# Patient Record
(demographics unavailable — no encounter records)

---

## 2024-10-08 NOTE — PROCEDURE
[FreeTextEntry3] : Injection Procedure Note:  The risks, benefits, and alternatives to viscosupplementation injection were reviewed with the patient. Risks outlined include but are not limited to infection, sepsis, bleeding, scarring, temporary increase in pain, syncopal episode, failure to resolve symptoms, symptoms recurrence, allergic reaction, flare reaction, pseudoseptic reaction.  Patient understood the risks and asked to proceed with this treatment course.  Patient Identification Name/: Verbal with patient and/or family  Procedure Verification: Procedure confirmed with patient or family/designee Consent for procedure: Verbal Consent Given Relevant documentation completed, reviewed, and signed Clinical indications for procedure confirmed  Time-out with all members of procedure team immediately prior to procedure: Correct patient identified. Agreement on procedure. Correct side and site.   KNEE INJECTION (Visco) - RIGHT After verbal consent and identification of the correct patient and correct site, the right knee were prepped using alcohol swabs and betadine. This was allowed time to air dry.  An injection of EUFLEXXA 2ml  #2 was injected into the knee using a sterile 22G needle after ethyl chloride spray for skin anesthesia.  The patient tolerated the procedure well. After-care instructions were provided and included instructions to ice the area and to call if redness, pain, or fever develop.

## 2024-10-08 NOTE — IMAGING
[de-identified] : RIGHT KNEE Inspection:  minimal effusion Palpation: medial facet of the patella tenderness  Knee Range of Motion:  0-130 Strength: 5/5 Quadriceps strength, 5/5 Hamstring strength, 4/5 Hip Abductor strength Neurological: light touch is intact throughout Ligament Stability and Special Tests:  McMurrays: neg Lachman: neg Pivot Shift: neg Posterior Drawer: neg Valgus: neg Varus: neg Patella Apprehension: neg Patella Maltracking: neg

## 2024-10-08 NOTE — HISTORY OF PRESENT ILLNESS
[de-identified] : 46 year old male  (RHD,  [facility]  ) right knee chronic pain worsen since 9/2023     The pain is located  anterior, medial deep  The pain is associated with swelling, clicking  Worse with activity and better at rest. Has tried ice, heat, nsaids   12/14/23 - had  CSI (11/20/23) without much relief, doing [facility] 1/11/24 - had mri, cont achiness in knee 2/15/24 - sent for more PT, icing, nsaids, wants to discuss poss visco 2/22/24 - here for Right knee Orthovisc #2 3/7/24 - here for Right knee Orthovisc #3 3/7/24 - here for Right knee Orthovisc #4  9/9/24- had good relief from visco but now pain reutnrs since summer 2024 worse with activity 9/26/24 - had CSI (9/9), continue to have knee pain 10/10/24 - here for R knee Euflexxa #2

## 2024-10-08 NOTE — ASSESSMENT
[FreeTextEntry1] : mri right knee ZP 12/18/23 - early chondral loss pf and medial, tiny LMT, synov    - We discussed their diagnosis and treatment options at length including the risks and benefits of both surgical treatment with a knee replacement and non-surgical options. Surgical risks include but are not limited to pain, infection, bleeding, vascular injury, numbness, tingling, nerve damage. - Due to risks of surgery, they will continue conservative treatment with PT, icing, and anti-inflammatory medications - The patient was provided with a PT prescription to work on ROM, hip ER/abductors strengthening, quad/hamstring stretches and strengthening, and other exercises - The patient was advised to let pain guide the gradual advancement of activities. - we spoke about possibility of viscosupplementation injections and they would like to proceed - R knee Euflexxa #2 given, frederic well

## 2024-10-10 NOTE — IMAGING
[de-identified] : RIGHT KNEE Inspection:  minimal effusion Palpation: medial facet of the patella tenderness  Knee Range of Motion:  0-130 Strength: 5/5 Quadriceps strength, 5/5 Hamstring strength, 4/5 Hip Abductor strength Neurological: light touch is intact throughout Ligament Stability and Special Tests:  McMurrays: neg Lachman: neg Pivot Shift: neg Posterior Drawer: neg Valgus: neg Varus: neg Patella Apprehension: neg Patella Maltracking: neg

## 2024-10-10 NOTE — HISTORY OF PRESENT ILLNESS
[de-identified] : 46 year old male  (RHD,  [facility]  ) right knee chronic pain worsen since 9/2023     The pain is located  anterior, medial deep  The pain is associated with swelling, clicking  Worse with activity and better at rest. Has tried ice, heat, nsaids   12/14/23 - had  CSI (11/20/23) without much relief, doing [facility] 1/11/24 - had mri, cont achiness in knee 2/15/24 - sent for more PT, icing, nsaids, wants to discuss poss visco 2/22/24 - here for Right knee Orthovisc #2 3/7/24 - here for Right knee Orthovisc #3 3/7/24 - here for Right knee Orthovisc #4  9/9/24- had good relief from visco but now pain reutnrs since summer 2024 worse with activity 9/26/24 - had CSI (9/9), continue to have knee pain 10/10/24 - here for R knee Euflexxa #2

## 2024-10-17 NOTE — IMAGING
[de-identified] : RIGHT KNEE Inspection:  minimal effusion Palpation: medial facet of the patella tenderness  Knee Range of Motion:  0-130 Strength: 5/5 Quadriceps strength, 5/5 Hamstring strength, 4/5 Hip Abductor strength Neurological: light touch is intact throughout Ligament Stability and Special Tests:  McMurrays: neg Lachman: neg Pivot Shift: neg Posterior Drawer: neg Valgus: neg Varus: neg Patella Apprehension: neg Patella Maltracking: neg

## 2024-10-17 NOTE — ASSESSMENT
[FreeTextEntry1] : mri right knee ZP 12/18/23 - early chondral loss pf and medial, tiny LMT, synov    - We discussed their diagnosis and treatment options at length including the risks and benefits of both surgical treatment with a knee replacement and non-surgical options. Surgical risks include but are not limited to pain, infection, bleeding, vascular injury, numbness, tingling, nerve damage. - Due to risks of surgery, they will continue conservative treatment with PT, icing, and anti-inflammatory medications - The patient was provided with a PT prescription to work on ROM, hip ER/abductors strengthening, quad/hamstring stretches and strengthening, and other exercises - The patient was advised to let pain guide the gradual advancement of activities. - we spoke about possibility of viscosupplementation injections and they would like to proceed - R knee Euflexxa #3 given, frederic well

## 2024-10-17 NOTE — HISTORY OF PRESENT ILLNESS
[de-identified] : 46 year old male  (RHD,  [facility]  ) right knee chronic pain worsen since 9/2023     The pain is located  anterior, medial deep  The pain is associated with swelling, clicking  Worse with activity and better at rest. Has tried ice, heat, nsaids   12/14/23 - had  CSI (11/20/23) without much relief, doing [facility] 1/11/24 - had mri, cont achiness in knee 2/15/24 - sent for more PT, icing, nsaids, wants to discuss poss visco 2/22/24 - here for Right knee Orthovisc #2 3/7/24 - here for Right knee Orthovisc #3 3/7/24 - here for Right knee Orthovisc #4  9/9/24- had good relief from visco but now pain reutnrs since summer 2024 worse with activity 9/26/24 - had CSI (9/9), continue to have knee pain 10/10/24 - here for R knee Euflexxa #2  10/17/24 - here for R knee Euflexxa #3

## 2024-10-17 NOTE — HISTORY OF PRESENT ILLNESS
[de-identified] : 46 year old male  (RHD,  [facility]  ) right knee chronic pain worsen since 9/2023     The pain is located  anterior, medial deep  The pain is associated with swelling, clicking  Worse with activity and better at rest. Has tried ice, heat, nsaids   12/14/23 - had  CSI (11/20/23) without much relief, doing [facility] 1/11/24 - had mri, cont achiness in knee 2/15/24 - sent for more PT, icing, nsaids, wants to discuss poss visco 2/22/24 - here for Right knee Orthovisc #2 3/7/24 - here for Right knee Orthovisc #3 3/7/24 - here for Right knee Orthovisc #4  9/9/24- had good relief from visco but now pain reutnrs since summer 2024 worse with activity 9/26/24 - had CSI (9/9), continue to have knee pain 10/10/24 - here for R knee Euflexxa #2  10/17/24 - here for R knee Euflexxa #3

## 2024-10-17 NOTE — IMAGING
[de-identified] : RIGHT KNEE Inspection:  minimal effusion Palpation: medial facet of the patella tenderness  Knee Range of Motion:  0-130 Strength: 5/5 Quadriceps strength, 5/5 Hamstring strength, 4/5 Hip Abductor strength Neurological: light touch is intact throughout Ligament Stability and Special Tests:  McMurrays: neg Lachman: neg Pivot Shift: neg Posterior Drawer: neg Valgus: neg Varus: neg Patella Apprehension: neg Patella Maltracking: neg

## 2024-10-17 NOTE — PROCEDURE
[FreeTextEntry3] : Injection Procedure Note:  The risks, benefits, and alternatives to viscosupplementation injection were reviewed with the patient. Risks outlined include but are not limited to infection, sepsis, bleeding, scarring, temporary increase in pain, syncopal episode, failure to resolve symptoms, symptoms recurrence, allergic reaction, flare reaction, pseudoseptic reaction.  Patient understood the risks and asked to proceed with this treatment course.  Patient Identification Name/: Verbal with patient and/or family  Procedure Verification: Procedure confirmed with patient or family/designee Consent for procedure: Verbal Consent Given Relevant documentation completed, reviewed, and signed Clinical indications for procedure confirmed  Time-out with all members of procedure team immediately prior to procedure: Correct patient identified. Agreement on procedure. Correct side and site.   KNEE INJECTION (Visco) - RIGHT After verbal consent and identification of the correct patient and correct site, the right knee were prepped using alcohol swabs and betadine. This was allowed time to air dry.  An injection of EUFLEXXA 2ml  #3 was injected into the knee using a sterile 22G needle after ethyl chloride spray for skin anesthesia.  The patient tolerated the procedure well. After-care instructions were provided and included instructions to ice the area and to call if redness, pain, or fever develop.

## 2025-01-23 NOTE — IMAGING
[de-identified] : RIGHT KNEE Inspection:  minimal effusion Palpation: medial facet of the patella tenderness  Knee Range of Motion:  0-130 Strength: 5/5 Quadriceps strength, 5/5 Hamstring strength, 4/5 Hip Abductor strength Neurological: light touch is intact throughout Ligament Stability and Special Tests:  McMurrays: neg Lachman: neg Pivot Shift: neg Posterior Drawer: neg Valgus: neg Varus: neg Patella Apprehension: neg Patella Maltracking: neg

## 2025-01-23 NOTE — ASSESSMENT
[FreeTextEntry1] : mri right knee ZP 12/18/23 - early chondral loss pf and medial, tiny LMT, synov    - We discussed their diagnosis and treatment options at length including the risks and benefits of both surgical treatment with a knee replacement and non-surgical options. Surgical risks include but are not limited to pain, infection, bleeding, vascular injury, numbness, tingling, nerve damage. - Due to risks of surgery, they will continue conservative treatment with PT, icing, and anti-inflammatory medications - The patient was provided with a PT prescription to work on ROM, hip ER/abductors strengthening, quad/hamstring stretches and strengthening, and other exercises - diclofenac rx - Patient was given a prescription for an anti-inflammatory medication.  They will take it for the next week and then on an as needed basis, as long as there are no medical contra-indications.  Patient is counseled on possible GI, renal, and cardiovascular side effects. - We also discussed the possible of a corticosteroid injection in order to help decrease inflammation and pain so that they can perform better therapy and they wished to proceed with this treatment course. - The patient was advised to let pain guide the gradual advancement of activities.

## 2025-01-23 NOTE — HISTORY OF PRESENT ILLNESS
[de-identified] : 46 year old male  (RHD,  [facility]  ) right knee chronic pain worsen since 9/2023     The pain is located  anterior, medial deep  The pain is associated with swelling, clicking  Worse with activity and better at rest. Has tried ice, heat, nsaids   12/14/23 - had  CSI (11/20/23) without much relief, doing [facility] 1/11/24 - had mri, cont achiness in knee 2/15/24 - sent for more PT, icing, nsaids, wants to discuss poss visco 2/22/24 - here for Right knee Orthovisc #2 3/7/24 - here for Right knee Orthovisc #3 3/7/24 - here for Right knee Orthovisc #4  9/9/24- had good relief from visco but now pain reutnrs since summer 2024 worse with activity 9/26/24 - had CSI (9/9), continue to have knee pain 10/10/24 - here for R knee Euflexxa #2  10/17/24 - here for R knee Euflexxa #3 1/23/25- R knee pain returns due to activity.  had temp rleief with visco, doing HEP

## 2025-04-29 NOTE — ASSESSMENT
[FreeTextEntry1] : mri right knee ZP 12/18/23 - early chondral loss pf and medial, tiny LMT, synov  Right X-Ray Examination of the KNEE (4 views): medial, lateral and patellofemoral degenerative changes.   - We discussed their diagnosis and treatment options at length including the risks and benefits of both surgical treatment with a knee replacement and non-surgical options. Surgical risks include but are not limited to pain, infection, bleeding, vascular injury, numbness, tingling, nerve damage. - Due to risks of surgery, they will continue conservative treatment with PT, icing, and anti-inflammatory medications - The patient was provided with a PT prescription to work on ROM, hip ER/abductors strengthening, quad/hamstring stretches and strengthening, and other exercises - diclofenac rx - Patient was given a prescription for an anti-inflammatory medication.  They will take it for the next week and then on an as needed basis, as long as there are no medical contra-indications.  Patient is counseled on possible GI, renal, and cardiovascular side effects. - We also discussed the possible of a corticosteroid injection in order to help decrease inflammation and pain so that they can perform better therapy and they wished to proceed with this treatment course. - The patient was advised to let pain guide the gradual advancement of activities.

## 2025-04-29 NOTE — IMAGING
[de-identified] : RIGHT KNEE Inspection:  minimal effusion Palpation: lateral and medial facet of the patella tenderness  Knee Range of Motion:  0-130 Strength: 5/5 Quadriceps strength, 5/5 Hamstring strength, 4/5 Hip Abductor strength Neurological: light touch is intact throughout Ligament Stability and Special Tests:  McMurrays: neg Lachman: neg Pivot Shift: neg Posterior Drawer: neg Valgus: neg Varus: neg Patella Apprehension: neg Patella Maltracking: neg

## 2025-04-29 NOTE — HISTORY OF PRESENT ILLNESS
[de-identified] : 46 year old male  (RHD,  [facility]  ) right knee chronic pain worsen since 9/2023     The pain is located  anterior, medial, lateral deep  The pain is associated with swelling, clicking  Worse with activity and better at rest. Has tried ice, heat, nsaids   12/14/23 - had  CSI (11/20/23) without much relief, doing [facility] 1/11/24 - had mri, cont achiness in knee 2/15/24 - sent for more PT, icing, nsaids, wants to discuss poss visco 2/22/24 - here for Right knee Orthovisc #2 3/7/24 - here for Right knee Orthovisc #3 3/7/24 - here for Right knee Orthovisc #4  9/9/24- had good relief from visco but now pain reutnrs since summer 2024 worse with activity 9/26/24 - had CSI (9/9), continue to have knee pain 10/10/24 - here for R knee Euflexxa #2  10/17/24 - here for R knee Euflexxa #3 1/23/25- R knee pain returns due to activity.  had temp rleief with visco, doing HEP 4/29/25 - CSI last visit (1/23/25) with relief until 3/28/25. Cont knee pain with activity.

## 2025-05-06 NOTE — HISTORY OF PRESENT ILLNESS
[de-identified] : 46 year old male  (RHD,  [facility]  ) right knee chronic pain worsen since 9/2023     The pain is located  anterior, medial, lateral deep  The pain is associated with swelling, clicking  Worse with activity and better at rest. Has tried ice, heat, nsaids   12/14/23 - had  CSI (11/20/23) without much relief, doing PT [facility] 1/11/24 - had mri, cont achiness in knee 2/15/24 - sent for more PT, icing, nsaids, wants to discuss poss visco 2/22/24 - here for Right knee Orthovisc #2 3/7/24 - here for Right knee Orthovisc #3 3/7/24 - here for Right knee Orthovisc #4  9/9/24- had good relief from visco but now pain reutnrs since summer 2024 worse with activity 9/26/24 - had CSI (9/9), continue to have knee pain 10/10/24 - here for R knee Euflexxa #2  10/17/24 - here for R knee Euflexxa #3 1/23/25- R knee pain returns due to activity.  had temp rleief with visco, doing HEP 4/29/25 - CSI last visit (1/23/25) with relief until 3/28/25. Cont knee pain with activity.  5/6/25 - had CSI (4/29), continue to have knee pain

## 2025-05-06 NOTE — ASSESSMENT
[FreeTextEntry1] : mri right knee ZP 12/18/23 - early chondral loss pf and medial, tiny LMT, synov  medial, lateral and patellofemoral degenerative changes.   - We discussed their diagnosis and treatment options at length including the risks and benefits of both surgical treatment with a knee replacement and non-surgical options. Surgical risks include but are not limited to pain, infection, bleeding, vascular injury, numbness, tingling, nerve damage. - Due to risks of surgery, they will continue conservative treatment with PT, icing, and anti-inflammatory medications - The patient was provided with a PT prescription to work on ROM, hip ER/abductors strengthening, quad/hamstring stretches and strengthening, and other exercises - The patient was advised to let pain guide the gradual advancement of activities. - we spoke about possibility of viscosupplementation injections and they want to proceed - R knee Euflexxa #1 given, frederic well    Medication Discussion: 1) We discussed a comprehensive treatment plan that included possible pharmaceutical management involving the use of prescription strength medications including but not limited to options such as oral Naprosyn 500mg BID, once daily Meloxicam 15 mg, or 500-650 mg Tylenol versus over the counter oral medications in addition to discussing possible topical prescription Pennsaid vs  Voltaren gel. 2) There is a moderate risk of morbidity with further treatment, especially from use of prescription strength medications and possible side effects of these medications which include but are not limited to upset stomach with oral medications, skin reactions to topical medications and GI/cardiac/renal issues with long term use. 3) I recommended that the patient follow-up with their medical physician if there are any significant potential issues with long term medication use such as interactions with current medications or with exacerbation of underlying medical comorbidities. 4) The benefits and risks associated with use of oral and / or topical prescription and over the counter anti-inflammatory medications were discussed with the patient. The patient voiced understanding of the risks including but not limited to bleeding, stroke, kidney dysfunction, heart disease, and were referred to the black box warning label for further information.

## 2025-05-06 NOTE — PROCEDURE
[FreeTextEntry3] :  Injection Procedure Note:  The risks, benefits, and alternatives to viscosupplementation injection were reviewed with the patient. Risks outlined include but are not limited to infection, sepsis, bleeding, scarring, temporary increase in pain, syncopal episode, failure to resolve symptoms, symptoms recurrence, allergic reaction, flare reaction, pseudoseptic reaction.  Patient understood the risks and asked to proceed with this treatment course.  Patient Identification Name/: Verbal with patient and/or family  Procedure Verification: Procedure confirmed with patient or family/designee Consent for procedure: Verbal Consent Given Relevant documentation completed, reviewed, and signed Clinical indications for procedure confirmed  Time-out with all members of procedure team immediately prior to procedure: Correct patient identified. Agreement on procedure. Correct side and site.   KNEE INJECTION (Visco) - RIGHT After verbal consent and identification of the correct patient and correct site, the right knee were prepped using alcohol swabs and betadine. This was allowed time to air dry.  An injection of EUFLEXXA 2ml  #1  was injected into the knee using a sterile 22G needle after ethyl chloride spray for skin anesthesia.  The patient tolerated the procedure well. After-care instructions were provided and included instructions to ice the area and to call if redness, pain, or fever develop.

## 2025-05-06 NOTE — IMAGING
[de-identified] : RIGHT KNEE Inspection:  minimal effusion Palpation: lateral and medial facet of the patella tenderness  Knee Range of Motion:  0-130 Strength: 5/5 Quadriceps strength, 5/5 Hamstring strength, 4/5 Hip Abductor strength Neurological: light touch is intact throughout Ligament Stability and Special Tests:  McMurrays: neg Lachman: neg Pivot Shift: neg Posterior Drawer: neg Valgus: neg Varus: neg Patella Apprehension: neg Patella Maltracking: neg

## 2025-05-06 NOTE — IMAGING
[de-identified] : RIGHT KNEE Inspection:  minimal effusion Palpation: lateral and medial facet of the patella tenderness  Knee Range of Motion:  0-130 Strength: 5/5 Quadriceps strength, 5/5 Hamstring strength, 4/5 Hip Abductor strength Neurological: light touch is intact throughout Ligament Stability and Special Tests:  McMurrays: neg Lachman: neg Pivot Shift: neg Posterior Drawer: neg Valgus: neg Varus: neg Patella Apprehension: neg Patella Maltracking: neg

## 2025-05-06 NOTE — HISTORY OF PRESENT ILLNESS
[de-identified] : 46 year old male  (RHD,  [facility]  ) right knee chronic pain worsen since 9/2023     The pain is located  anterior, medial, lateral deep  The pain is associated with swelling, clicking  Worse with activity and better at rest. Has tried ice, heat, nsaids   12/14/23 - had  CSI (11/20/23) without much relief, doing PT [facility] 1/11/24 - had mri, cont achiness in knee 2/15/24 - sent for more PT, icing, nsaids, wants to discuss poss visco 2/22/24 - here for Right knee Orthovisc #2 3/7/24 - here for Right knee Orthovisc #3 3/7/24 - here for Right knee Orthovisc #4  9/9/24- had good relief from visco but now pain reutnrs since summer 2024 worse with activity 9/26/24 - had CSI (9/9), continue to have knee pain 10/10/24 - here for R knee Euflexxa #2  10/17/24 - here for R knee Euflexxa #3 1/23/25- R knee pain returns due to activity.  had temp rleief with visco, doing HEP 4/29/25 - CSI last visit (1/23/25) with relief until 3/28/25. Cont knee pain with activity.  5/6/25 - had CSI (4/29), continue to have knee pain

## 2025-05-13 NOTE — IMAGING
[de-identified] : RIGHT KNEE Inspection:  minimal effusion Palpation: lateral and medial facet of the patella tenderness  Knee Range of Motion:  0-130 Strength: 5/5 Quadriceps strength, 5/5 Hamstring strength, 4/5 Hip Abductor strength Neurological: light touch is intact throughout Ligament Stability and Special Tests:  McMurrays: neg Lachman: neg Pivot Shift: neg Posterior Drawer: neg Valgus: neg Varus: neg Patella Apprehension: neg Patella Maltracking: neg      Split-Thickness Skin Graft Text: The defect edges were debeveled with a #15 scalpel blade.  Given the location of the defect, shape of the defect and the proximity to free margins a split thickness skin graft was deemed most appropriate.  Using a sterile surgical marker, the primary defect shape was transferred to the donor site. The split thickness graft was then harvested.  The skin graft was then placed in the primary defect and oriented appropriately.

## 2025-05-13 NOTE — IMAGING
[de-identified] : RIGHT KNEE Inspection:  minimal effusion Palpation: lateral and medial facet of the patella tenderness  Knee Range of Motion:  0-130 Strength: 5/5 Quadriceps strength, 5/5 Hamstring strength, 4/5 Hip Abductor strength Neurological: light touch is intact throughout Ligament Stability and Special Tests:  McMurrays: neg Lachman: neg Pivot Shift: neg Posterior Drawer: neg Valgus: neg Varus: neg Patella Apprehension: neg Patella Maltracking: neg

## 2025-05-13 NOTE — HISTORY OF PRESENT ILLNESS
[de-identified] : 46 year old male  (RHD,  [facility]  ) right knee chronic pain worsen since 9/2023     The pain is located  anterior, medial, lateral deep  The pain is associated with swelling, clicking  Worse with activity and better at rest. Has tried ice, heat, nsaids   12/14/23 - had  CSI (11/20/23) without much relief, doing PT [facility] 1/11/24 - had mri, cont achiness in knee 2/15/24 - sent for more PT, icing, nsaids, wants to discuss poss visco 2/22/24 - here for Right knee Orthovisc #2 3/7/24 - here for Right knee Orthovisc #3 3/7/24 - here for Right knee Orthovisc #4  9/9/24- had good relief from visco but now pain reutnrs since summer 2024 worse with activity 9/26/24 - had CSI (9/9), continue to have knee pain 10/10/24 - here for R knee Euflexxa #2  10/17/24 - here for R knee Euflexxa #3 1/23/25- R knee pain returns due to activity.  had temp rleief with visco, doing HEP 4/29/25 - CSI last visit (1/23/25) with relief until 3/28/25. Cont knee pain with activity.  5/6/25 - had CSI (4/29), continue to have knee pain 5/13/25 - here for R knee Euflexxa #2

## 2025-05-13 NOTE — HISTORY OF PRESENT ILLNESS
[de-identified] : 46 year old male  (RHD,  [facility]  ) right knee chronic pain worsen since 9/2023     The pain is located  anterior, medial, lateral deep  The pain is associated with swelling, clicking  Worse with activity and better at rest. Has tried ice, heat, nsaids   12/14/23 - had  CSI (11/20/23) without much relief, doing [facility] 1/11/24 - had mri, cont achiness in knee 2/15/24 - sent for more PT, icing, nsaids, wants to discuss poss visco 2/22/24 - here for Right knee Orthovisc #2 3/7/24 - here for Right knee Orthovisc #3 3/7/24 - here for Right knee Orthovisc #4  9/9/24- had good relief from visco but now pain reutnrs since summer 2024 worse with activity 9/26/24 - had CSI (9/9), continue to have knee pain 10/10/24 - here for R knee Euflexxa #2  10/17/24 - here for R knee Euflexxa #3 1/23/25- R knee pain returns due to activity.  had temp rleief with visco, doing HEP 4/29/25 - CSI last visit (1/23/25) with relief until 3/28/25. Cont knee pain with activity.  5/6/25 - had CSI (4/29), continue to have knee pain 5/13/25 - here for R knee Euflexxa #2

## 2025-05-13 NOTE — ASSESSMENT
[FreeTextEntry1] : mri right knee ZP 12/18/23 - early chondral loss pf and medial, tiny LMT, synov  medial, lateral and patellofemoral degenerative changes.   - We discussed their diagnosis and treatment options at length including the risks and benefits of both surgical treatment with a knee replacement and non-surgical options. Surgical risks include but are not limited to pain, infection, bleeding, vascular injury, numbness, tingling, nerve damage. - Due to risks of surgery, they will continue conservative treatment with PT, icing, and anti-inflammatory medications - The patient was provided with a PT prescription to work on ROM, hip ER/abductors strengthening, quad/hamstring stretches and strengthening, and other exercises - The patient was advised to let pain guide the gradual advancement of activities. - we spoke about possibility of viscosupplementation injections and they want to proceed - R knee Euflexxa #2 given, frederic well   
[FreeTextEntry1] : mri right knee ZP 12/18/23 - early chondral loss pf and medial, tiny LMT, synov  medial, lateral and patellofemoral degenerative changes.   - We discussed their diagnosis and treatment options at length including the risks and benefits of both surgical treatment with a knee replacement and non-surgical options. Surgical risks include but are not limited to pain, infection, bleeding, vascular injury, numbness, tingling, nerve damage. - Due to risks of surgery, they will continue conservative treatment with PT, icing, and anti-inflammatory medications - The patient was provided with a PT prescription to work on ROM, hip ER/abductors strengthening, quad/hamstring stretches and strengthening, and other exercises - The patient was advised to let pain guide the gradual advancement of activities. - we spoke about possibility of viscosupplementation injections and they want to proceed - R knee Euflexxa #2 given, frederic well   
Occiput Anterior

## 2025-05-20 NOTE — IMAGING
[de-identified] : RIGHT KNEE Inspection:  minimal effusion Palpation: lateral and medial facet of the patella tenderness  Knee Range of Motion:  0-130 Strength: 5/5 Quadriceps strength, 5/5 Hamstring strength, 4/5 Hip Abductor strength Neurological: light touch is intact throughout Ligament Stability and Special Tests:  McMurrays: neg Lachman: neg Pivot Shift: neg Posterior Drawer: neg Valgus: neg Varus: neg Patella Apprehension: neg Patella Maltracking: neg

## 2025-05-20 NOTE — HISTORY OF PRESENT ILLNESS
[de-identified] : 46 year old male  (RHD,  [facility]  ) right knee chronic pain worsen since 9/2023     The pain is located  anterior, medial, lateral deep  The pain is associated with swelling, clicking  Worse with activity and better at rest. Has tried ice, heat, nsaids   12/14/23 - had  CSI (11/20/23) without much relief, doing PT [facility] with [facility] 1/11/24 - had mri, cont achiness in knee 2/15/24 - sent for more PT, icing, nsaids, wants to discuss poss visco 2/22/24 - here for Right knee Orthovisc #2 3/7/24 - here for Right knee Orthovisc #3 3/7/24 - here for Right knee Orthovisc #4  9/9/24- had good relief from visco but now pain reutnrs since summer 2024 worse with activity 9/26/24 - had CSI (9/9), continue to have knee pain 10/10/24 - here for R knee Euflexxa #2  10/17/24 - here for R knee Euflexxa #3 1/23/25- R knee pain returns due to activity.  had temp rleief with visco, doing HEP 4/29/25 - CSI last visit (1/23/25) with relief until 3/28/25. Cont knee pain with activity.  5/6/25 - had CSI (4/29), continue to have knee pain 5/13/25 - here for R knee Euflexxa #2 5/20/25 - here for Rt knee Euflexxa #3

## 2025-05-20 NOTE — IMAGING
[de-identified] : RIGHT KNEE Inspection:  minimal effusion Palpation: lateral and medial facet of the patella tenderness  Knee Range of Motion:  0-130 Strength: 5/5 Quadriceps strength, 5/5 Hamstring strength, 4/5 Hip Abductor strength Neurological: light touch is intact throughout Ligament Stability and Special Tests:  McMurrays: neg Lachman: neg Pivot Shift: neg Posterior Drawer: neg Valgus: neg Varus: neg Patella Apprehension: neg Patella Maltracking: neg

## 2025-05-20 NOTE — HISTORY OF PRESENT ILLNESS
[de-identified] : 46 year old male  (RHD,  [facility]  ) right knee chronic pain worsen since 9/2023     The pain is located  anterior, medial, lateral deep  The pain is associated with swelling, clicking  Worse with activity and better at rest. Has tried ice, heat, nsaids   12/14/23 - had  CSI (11/20/23) without much relief, doing PT [facility] 1/11/24 - had mri, cont achiness in knee 2/15/24 - sent for more PT, icing, nsaids, wants to discuss poss visco 2/22/24 - here for Right knee Orthovisc #2 3/7/24 - here for Right knee Orthovisc #3 3/7/24 - here for Right knee Orthovisc #4  9/9/24- had good relief from visco but now pain reutnrs since summer 2024 worse with activity 9/26/24 - had CSI (9/9), continue to have knee pain 10/10/24 - here for R knee Euflexxa #2  10/17/24 - here for R knee Euflexxa #3 1/23/25- R knee pain returns due to activity.  had temp rleief with visco, doing HEP 4/29/25 - CSI last visit (1/23/25) with relief until 3/28/25. Cont knee pain with activity.  5/6/25 - had CSI (4/29), continue to have knee pain 5/13/25 - here for R knee Euflexxa #2 5/20/25 - here for Rt knee Euflexxa #3

## 2025-05-20 NOTE — ASSESSMENT
[FreeTextEntry1] : mri right knee ZP 12/18/23 - early chondral loss pf and medial, tiny LMT, synov  medial, lateral and patellofemoral degenerative changes.   - We discussed their diagnosis and treatment options at length including the risks and benefits of both surgical treatment with a knee replacement and non-surgical options. Surgical risks include but are not limited to pain, infection, bleeding, vascular injury, numbness, tingling, nerve damage. - Due to risks of surgery, they will continue conservative treatment with PT, icing, and anti-inflammatory medications - The patient was provided with a PT prescription to work on ROM, hip ER/abductors strengthening, quad/hamstring stretches and strengthening, and other exercises - The patient was advised to let pain guide the gradual advancement of activities. - we spoke about possibility of viscosupplementation injections and they want to proceed - R knee Euflexxa #3 given, frederic well